# Patient Record
(demographics unavailable — no encounter records)

---

## 2024-12-23 NOTE — REVIEW OF SYSTEMS
Medication is being filled for 1 time refill only due to:  Future labs ordered due for TSH recheck.   [Negative] : Heme/Lymph [Depression] : depression [Suicidal] : not suicidal [Insomnia] : no insomnia [Anxiety] : no anxiety

## 2024-12-23 NOTE — HEALTH RISK ASSESSMENT
[No] : In the past 12 months have you used drugs other than those required for medical reasons? No [# of Members in Household ___] :  household currently consist of [unfilled] member(s) [] :  [Former] : Former [Audit-CScore] : 0 [ColonoscopyComments] : More than 10 years ago, normal per patient [de-identified] : Lives with mom [de-identified] : Quit 20 years ago, 1ppd.

## 2024-12-23 NOTE — HISTORY OF PRESENT ILLNESS
[FreeTextEntry1] : JONATHAN [de-identified] : Patient is 67-year-old male with PMH of depression, anxiety, HTN, HLD, hypothyroidism, GERD, peripheral neuropathy, presents to establish care.  Patient saw previous PCP last month and was discharged from the office due to conflict with .  Patient unable to provide complete updated medication list.  After reviewing different list of medication management for depression and anxiety, asked the patient for history of bipolar disorder which he denies.  Medication list included quetiapine, lorazepam, bupropion, venlafaxine.  Per patient medications have been modified, he might be taking quetiapine.  He sees therapist - psychologist. Charo Vidal ([facility]) via telehealth and has appointment today.  Currently patient does not have psychiatrist, [place] doctor.  Covid vaccinated Never receives flu vaccine. Colonoscopy ~10 years ago.

## 2024-12-23 NOTE — HEALTH RISK ASSESSMENT
[No] : In the past 12 months have you used drugs other than those required for medical reasons? No [# of Members in Household ___] :  household currently consist of [unfilled] member(s) [] :  [Former] : Former [Audit-CScore] : 0 [ColonoscopyComments] : More than 10 years ago, normal per patient [de-identified] : Lives with mom [de-identified] : Quit 20 years ago, 1ppd.

## 2024-12-23 NOTE — ASSESSMENT
[FreeTextEntry1] : Will obtain records from previous PCP and psychologist.  Follow-up in 1 month or earlier as needed for concerns or medication refill.

## 2024-12-23 NOTE — HISTORY OF PRESENT ILLNESS
[FreeTextEntry1] : JONATHAN [de-identified] : Patient is 67-year-old male with PMH of depression, anxiety, HTN, HLD, hypothyroidism, GERD, peripheral neuropathy, presents to establish care.  Patient saw previous PCP last month and was discharged from the office due to conflict with .  Patient unable to provide complete updated medication list.  After reviewing different list of medication management for depression and anxiety, asked the patient for history of bipolar disorder which he denies.  Medication list included quetiapine, lorazepam, bupropion, venlafaxine.  Per patient medications have been modified, he might be taking quetiapine.  He sees therapist - psychologist. Charo Vidal ([facility]) via telehealth and has appointment today.  Currently patient does not have psychiatrist, [place] doctor.  Covid vaccinated Never receives flu vaccine. Colonoscopy ~10 years ago.

## 2025-01-21 NOTE — HISTORY OF PRESENT ILLNESS
[FreeTextEntry1] : 1 month follow up [de-identified] : Patient is 67-year-old male with PMH of depression, anxiety, PTSD, HTN, HLD, hypothyroidism, GERD, peripheral neuropathy, presents for follow up.  Patient again unable to provide complete updated medication list during the visit. Old medication list included quetiapine, lorazepam, bupropion, venlafaxine, but pt reported that was not up to date. Per patient medications have been modified by previous PCP [facility] and here in .  He has not seen psychiatrist in long time. He continues with therapist Charo Vidal ([facility]) via telehealth.    Last PCP report 11/2024: venlafaxine 75mg qd, lorazepam 0.5mg bid prn, quetiapine 300mg bid, trazodone 100mg qhs.   * Pt called to the office after appointment and reports that he takes: quetiapine 300mg bid, venlafaxine 75mg qd, hydroxicine 50mg qhs.  Medication list updated.

## 2025-01-21 NOTE — ASSESSMENT
[FreeTextEntry1] : Declines flu vaccine.  Lab results reviewed with patient. Stop B12. No indications for ASA, recommended to stop it. HLD controlled.  Pt was post-respiratory infection during previous visit with mild increase in WBC.

## 2025-05-16 NOTE — PHYSICAL EXAM
[No Acute Distress] : no acute distress [Well-Appearing] : well-appearing [Normal Oropharynx] : the oropharynx was normal [Supple] : supple [No Respiratory Distress] : no respiratory distress  [Clear to Auscultation] : lungs were clear to auscultation bilaterally [Normal Rate] : normal rate  [Regular Rhythm] : with a regular rhythm [Normal S1, S2] : normal S1 and S2 [No Edema] : there was no peripheral edema [No Focal Deficits] : no focal deficits [Normal Gait] : normal gait [Normal Affect] : the affect was normal [Normal Mood] : the mood was normal [Normal Insight/Judgement] : insight and judgment were intact [de-identified] : Papular erythema with superficial dryness in the face, mild in the abdomen and with some blisters in hands.

## 2025-05-16 NOTE — PHYSICAL EXAM
[No Acute Distress] : no acute distress [Well-Appearing] : well-appearing [Normal Oropharynx] : the oropharynx was normal [Supple] : supple [No Respiratory Distress] : no respiratory distress  [Clear to Auscultation] : lungs were clear to auscultation bilaterally [Normal Rate] : normal rate  [Regular Rhythm] : with a regular rhythm [Normal S1, S2] : normal S1 and S2 [No Edema] : there was no peripheral edema [No Focal Deficits] : no focal deficits [Normal Gait] : normal gait [Normal Affect] : the affect was normal [Normal Mood] : the mood was normal [Normal Insight/Judgement] : insight and judgment were intact [de-identified] : Papular erythema with superficial dryness in the face, mild in the abdomen and with some blisters in hands.

## 2025-05-16 NOTE — HISTORY OF PRESENT ILLNESS
[FreeTextEntry8] : Patient is 68-year-old male with PMH of depression, anxiety, PTSD, HTN, HLD, hypothyroidism, GERD, peripheral neuropathy, who presents itchiness.  Patient reports he was working on gardening 3 days ago and after started to present itchy lesions in his hands and the following day in his face, abdominal and inguinal area.  He has not had poison ivy in the past and the lesions are the same.  Last week patient and his therapist were requesting increasing the dose of the venlafaxine due to persistent active depression.  Records reviewed and patient was on venlafaxine 150 mg daily per psychiatry but for unknown reason his previous PCP decrease it the dose some months ago to 75 Mg daily.  Last week venlafaxine was increased to 150 mg daily as previously prescribed by psychiatrist and patient already reports improvement in symptoms and he continues with therapy

## 2025-07-15 NOTE — HISTORY OF PRESENT ILLNESS
[FreeTextEntry1] : 2 month follow up/Wants labs done [de-identified] : Patient is 68-year-old male with PMH of depression, anxiety, PTSD, HTN, HLD, hypothyroidism, GERD, peripheral neuropathy, presents for follow up.  Pt compliant with medications but not taking them properly; takes most of them at once in the evening. Continues on therapy and overall stable. He has not found appointment with psychiatrist yet; some places were not receiving new patients and on waiting list for others.  Pt reports episodes of difficulty swallowing solids during the past 2 weeks which is new for him. He drunk fluids and it gets better. He admits eating on bed instead of completely seating on a chair.   Pt never made appointment with cardio, no cardiovascular symptoms. Patient unsure if he should continue with metformin, ask for recommendations. In the past he was taking metformin for " weight loss" and has been off of it this year.

## 2025-07-15 NOTE — PHYSICAL EXAM
[No Acute Distress] : no acute distress [Well-Appearing] : well-appearing [Normal Sclera/Conjunctiva] : normal sclera/conjunctiva [Normal Oropharynx] : the oropharynx was normal [Supple] : supple [No Respiratory Distress] : no respiratory distress  [Clear to Auscultation] : lungs were clear to auscultation bilaterally [Normal Rate] : normal rate  [Regular Rhythm] : with a regular rhythm [Normal S1, S2] : normal S1 and S2 [Soft] : abdomen soft [Non Tender] : non-tender [Grossly Normal Strength/Tone] : grossly normal strength/tone [No Focal Deficits] : no focal deficits [Normal Affect] : the affect was normal [Alert and Oriented x3] : oriented to person, place, and time [Normal Mood] : the mood was normal [de-identified] : distal non-pitting edema